# Patient Record
Sex: MALE | Race: WHITE | NOT HISPANIC OR LATINO | URBAN - METROPOLITAN AREA
[De-identification: names, ages, dates, MRNs, and addresses within clinical notes are randomized per-mention and may not be internally consistent; named-entity substitution may affect disease eponyms.]

---

## 2022-08-28 ENCOUNTER — EMERGENCY (EMERGENCY)
Facility: HOSPITAL | Age: 47
LOS: 0 days | Discharge: HOME | End: 2022-08-28
Attending: EMERGENCY MEDICINE | Admitting: EMERGENCY MEDICINE

## 2022-08-28 VITALS
TEMPERATURE: 96 F | HEART RATE: 62 BPM | OXYGEN SATURATION: 100 % | SYSTOLIC BLOOD PRESSURE: 136 MMHG | WEIGHT: 248.9 LBS | DIASTOLIC BLOOD PRESSURE: 77 MMHG | HEIGHT: 69 IN | RESPIRATION RATE: 16 BRPM

## 2022-08-28 DIAGNOSIS — T63.461A TOXIC EFFECT OF VENOM OF WASPS, ACCIDENTAL (UNINTENTIONAL), INITIAL ENCOUNTER: ICD-10-CM
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
## 2022-08-28 DIAGNOSIS — R22.32 LOCALIZED SWELLING, MASS AND LUMP, LEFT UPPER LIMB: ICD-10-CM

## 2022-08-28 PROCEDURE — 99283 EMERGENCY DEPT VISIT LOW MDM: CPT

## 2022-08-28 RX ORDER — DEXAMETHASONE 0.5 MG/5ML
10 ELIXIR ORAL ONCE
Refills: 0 | Status: COMPLETED | OUTPATIENT
Start: 2022-08-28 | End: 2022-08-28

## 2022-08-28 RX ORDER — EPINEPHRINE 0.3 MG/.3ML
0.3 INJECTION INTRAMUSCULAR; SUBCUTANEOUS
Qty: 1 | Refills: 0
Start: 2022-08-28 | End: 2022-08-28

## 2022-08-28 RX ADMIN — Medication 10 MILLIGRAM(S): at 16:50

## 2022-08-28 NOTE — ED PROVIDER NOTE - CLINICAL SUMMARY MEDICAL DECISION MAKING FREE TEXT BOX
A/P; Wasp bite, recommend ice elevate, NSAIDs Benadryl as needed, Decadron given in ED, DC home with Rx for EpiPen as needed, follow-up PMD 1 to 2 weeks, strict return precautions

## 2022-08-28 NOTE — ED PROVIDER NOTE - CARE PROVIDER_API CALL
Richi Dennis (MD)  Medicine  [address] [place] [address]  Phone: [phone]  Fax: [phone]  Follow Up Time: Routine

## 2022-08-28 NOTE — ED PROVIDER NOTE - OBJECTIVE STATEMENT
47 year old male with no past medical history presenting to the ED for L hand swelling. states that he was stung by a wasp this afternoon this his L hand started to swell up. states that the hand feels itchy. took 2 table spoons of benadryl after this happened. denies any n/v, no difficulty speaking, no sensation of throat closing up, no sob or cp, no hives. no other complaints at this time.

## 2022-08-28 NOTE — ED PROVIDER NOTE - PHYSICAL EXAMINATION
CONSTITUTIONAL: Well-developed; well-nourished; in no acute distress.   SKIN: warm, dry. no rashes.  HEAD: Normocephalic; atraumatic.  EYES: PERRLA, EOMI, no conjunctival erythema.  ENT: No nasal discharge; airway clear. mmm.  NECK: Supple; non tender.  CARD: S1, S2 normal; no murmurs, gallops, or rubs. Regular rate and rhythm.   RESP: No wheezes, rales or rhonchi. no stridor. not in respiratory distress. lungs ctab.  ABD: soft ntnd; no masses, rebound, or guarding.  EXT: Normal ROM. No clubbing or cyanosis. (+) swelling of L hand, no erythema or warmth, no open wounds. NVI.  NEURO: Alert, oriented, grossly unremarkable. nml motor, strength, and sensation. no fnd. ambulating with a steady gait.  PSYCH: Cooperative, appropriate.

## 2022-08-28 NOTE — ED PROVIDER NOTE - NSFOLLOWUPINSTRUCTIONS_ED_ALL_ED_FT
Insect Bite or Sting    WHAT YOU NEED TO KNOW:    Most insect bites and stings are not dangerous and go away without treatment. Your symptoms may be mild, or you may develop anaphylaxis. Anaphylaxis is a sudden, life-threatening reaction that needs immediate treatment. Common examples of insects that bite or sting are bees, ticks, mosquitoes, spiders, and ants. Insect bites or stings can lead to diseases such as malaria, West Nile virus, Lyme disease, or [place] Spotted Fever.    DISCHARGE INSTRUCTIONS:    Call 911 for signs or symptoms of anaphylaxis, such as trouble breathing, swelling in your mouth or throat, or wheezing. You may also have itching, a rash, hives, or feel like you are going to faint.    Return to the emergency department if:     You are stung on your tongue or in your throat.      A white area forms around the bite.      You are sweating badly or have body pain.      You think you were bitten or stung by a poisonous insect.    Contact your healthcare provider if:     You have a fever.      The area becomes red, warm, tender, and swollen beyond the area of the bite or sting.      You have questions or concerns about your condition or care.    Medicines:     Antihistamines decrease itching and rash.       Epinephrine is used to treat severe allergic reactions such as anaphylaxis.       Take your medicine as directed. Contact your healthcare provider if you think your medicine is not helping or if you have side effects. Tell him of her if you are allergic to any medicine. Keep a list of the medicines, vitamins, and herbs you take. Include the amounts, and when and why you take them. Bring the list or the pill bottles to follow-up visits. Carry your medicine list with you in case of an emergency.    Steps to take for signs or symptoms of anaphylaxis:     Immediately give 1 shot of epinephrine only into the outer thigh muscle.       Leave the shot in place as directed. Your healthcare provider may recommend you leave it in place for up to 10 seconds before you remove it. This helps make sure all of the epinephrine is delivered.       Call 911 and go to the emergency department, even if the shot improved symptoms. Do not drive yourself. Bring the used epinephrine shot with you.     Safety precautions to take if you are at risk for anaphylaxis:     Keep 2 shots of epinephrine with you at all times. You may need a second shot, because epinephrine only works for about 20 minutes and symptoms may return. Your healthcare provider can show you and family members how to give the shot. Check the expiration date every month and replace it before it expires.      Create an action plan. Your healthcare provider can help you create a written plan that explains the allergy and an emergency plan to treat a reaction. The plan explains when to give a second epinephrine shot if symptoms return or do not improve after the first. Give copies of the action plan and emergency instructions to family members, work and school staff, and  providers. Show them how to give a shot of epinephrine.      Carry medical alert identification. Wear medical alert jewelry or carry a card that says you have an insect allergy. Ask your healthcare provider where to get these items. Medical Alert Jewelry         If an insect bites or stings you:     Remove the stinger. Scrape the stinger out with your fingernail, edge of a credit card, or a knife blade. Do not squeeze the wound. Gently wash the area with soap and water.      Remove the tick. Ticks must be removed as soon as possible so you do not get diseases passed through tick bites. Ask your healthcare provider for more information on tick bites and how to remove ticks.    Care for a bite or sting wound:     Elevate the affected area. Prop the wound above the level of your heart, if possible. Elevate the area for 10 to 20 minutes each hour or as directed by your healthcare provider.      Use compresses. Soak a clean washcloth in cold water, wring it out, and put it on the bite or sting. Use the compress for 10 to 20 minutes each hour or as directed by your healthcare provider. After 24 to 48 hours, change to warm compresses.       Apply a paste. Add water to baking soda to make a thick paste. Put the paste on the area for 5 minutes. Rinse gently to remove the paste.     Prevent another insect bite or sting:     Do not wear bright-colored or flower-print clothing when you plan to spend time outdoors. Do not use hairspray, perfumes, or aftershave.      Do not leave food out.      Empty any standing water and wash container with soap and water every 2 days.      Put screens on all open windows and doors.      Put insect repellent that contains DEET on skin that is showing when you go outside. Put insect repellent at the top of your boots, bottom of pant legs, and sleeve cuffs. Wear long sleeves, pants, and shoes.      Use citronella candles outdoors to help keep mosquitoes away. Put a tick and flea collar on pets.    Follow up with your healthcare provider as directed: Write down your questions so you remember to ask them during your visits.       © Copyright [employer] 2019 All illustrations and images included in CareNotes are the copyrighted property of [employer] or [employer].

## 2022-08-28 NOTE — ED PROVIDER NOTE - PATIENT PORTAL LINK FT
You can access the FollowMyHealth Patient Portal offered by [facility] by registering at the following website: [email or web address]. By joining [employer]’s FollowMyHealth portal, you will also be able to view your health information using other applications (apps) compatible with our system.

## 2022-08-28 NOTE — ED ADULT TRIAGE NOTE - CHIEF COMPLAINT QUOTE
Pt. stung by wasp to left hand about 1 hour ago. States he is allergic and had similar reaction 10 years ago. States hand is itchy, but not painful.

## 2022-08-28 NOTE — ED ADULT NURSE NOTE - NSFALLRSKINDICATORS_ED_ALL_ED
Message noted: Chart reviewed and may refill medication as requested times one. Prescription sent to listed pharmacy.  Pharmacy to notify patient to make appointment for further refills no

## 2022-08-28 NOTE — ED PROVIDER NOTE - ATTENDING CONTRIBUTION TO CARE
47-year-old male no past medical history presents with left hand wasp bite at 2 PM today.  Patient complains of swelling and itching.  States after he was bit he took 2 tablespoons of Benadryl.  No chest pain shortness of breath at this time.  No throat swelling.  No nausea vomiting abdominal pain.  No hives.    On exam, AFVSS, Well appearing, No acute distress, NCAT, EOMI, PERRLA, MMM, Neck supple, OP no erythema or edema, airway intact, LCTAB, Abdomen Soft NTND, AAOx3, No Focal Deficits, No LE edema or calf TTP, left hand diffuse soft tissue swelling nonpitting 2+ with small bite wound to the dorsum of left hand in between first and second digit webspace, no erythema or warmth, nontender, full range of motion, 5 out of 5 motor, sensation intact to light touch, 2+ radial pulse    A/P; Wasp bite, recommend ice elevate, NSAIDs Benadryl as needed, Decadron given in ED, DC home with Rx for EpiPen as needed, follow-up PMD 1 to 2 weeks, strict return precautions
Clothing

## 2022-11-19 NOTE — ED ADULT NURSE NOTE - NSIMPLEMENTINTERV_GEN_ALL_ED
no dysuria, no frequency, and no hematuria. Implemented All Universal Safety Interventions:  [place] to call system. Call bell, personal items and telephone within reach. Instruct patient to call for assistance. Room bathroom lighting operational. Non-slip footwear when patient is off stretcher. Physically safe environment: no spills, clutter or unnecessary equipment. Stretcher in lowest position, wheels locked, appropriate side rails in place.